# Patient Record
Sex: FEMALE | Race: WHITE | Employment: FULL TIME
[De-identification: names, ages, dates, MRNs, and addresses within clinical notes are randomized per-mention and may not be internally consistent; named-entity substitution may affect disease eponyms.]

---

## 2017-01-10 ENCOUNTER — OFFICE VISIT - HEALTHEAST (OUTPATIENT)
Dept: PODIATRY | Age: 67
End: 2017-01-10

## 2017-01-10 DIAGNOSIS — M21.6X1 PRONATION DEFORMITY OF BOTH FEET: ICD-10-CM

## 2017-01-10 DIAGNOSIS — M21.6X2 PRONATION DEFORMITY OF BOTH FEET: ICD-10-CM

## 2017-05-30 ENCOUNTER — AMBULATORY - RIVER FALLS (OUTPATIENT)
Dept: FAMILY MEDICINE | Facility: CLINIC | Age: 67
End: 2017-05-30
Payer: COMMERCIAL

## 2019-08-16 DIAGNOSIS — Z98.890 STATUS POST GASTRIC SURGERY: Primary | ICD-10-CM

## 2019-08-16 NOTE — PROGRESS NOTES
Lap band in 2004 with Dr. Martinez. Orders placed for upper GI. Will have clinic coordinators reach ou to patient to schedule before Monday's appointment 8/19/19 with Keily Morejon PA-C.

## 2019-08-19 ENCOUNTER — ANCILLARY PROCEDURE (OUTPATIENT)
Dept: GENERAL RADIOLOGY | Facility: CLINIC | Age: 69
End: 2019-08-19
Attending: PHYSICIAN ASSISTANT
Payer: COMMERCIAL

## 2019-08-19 ENCOUNTER — OFFICE VISIT (OUTPATIENT)
Dept: SURGERY | Facility: CLINIC | Age: 69
End: 2019-08-19
Payer: COMMERCIAL

## 2019-08-19 VITALS
OXYGEN SATURATION: 97 % | DIASTOLIC BLOOD PRESSURE: 75 MMHG | SYSTOLIC BLOOD PRESSURE: 111 MMHG | WEIGHT: 205.6 LBS | HEIGHT: 65 IN | HEART RATE: 107 BPM | BODY MASS INDEX: 34.26 KG/M2

## 2019-08-19 DIAGNOSIS — E66.09 CLASS 1 OBESITY DUE TO EXCESS CALORIES WITH SERIOUS COMORBIDITY AND BODY MASS INDEX (BMI) OF 34.0 TO 34.9 IN ADULT: Primary | ICD-10-CM

## 2019-08-19 DIAGNOSIS — Z98.890 STATUS POST GASTRIC SURGERY: ICD-10-CM

## 2019-08-19 DIAGNOSIS — E66.811 CLASS 1 OBESITY DUE TO EXCESS CALORIES WITH SERIOUS COMORBIDITY AND BODY MASS INDEX (BMI) OF 34.0 TO 34.9 IN ADULT: Primary | ICD-10-CM

## 2019-08-19 RX ORDER — OMEPRAZOLE 10 MG/1
20 CAPSULE, DELAYED RELEASE ORAL
COMMUNITY
Start: 2019-07-01

## 2019-08-19 RX ORDER — LISINOPRIL 2.5 MG/1
2.5 TABLET ORAL DAILY
Refills: 2 | COMMUNITY
Start: 2019-07-07

## 2019-08-19 RX ORDER — METFORMIN HCL 500 MG
500 TABLET, EXTENDED RELEASE 24 HR ORAL DAILY
Refills: 0 | COMMUNITY
Start: 2019-05-26

## 2019-08-19 RX ORDER — LIRAGLUTIDE 6 MG/ML
1.8 INJECTION SUBCUTANEOUS DAILY
COMMUNITY

## 2019-08-19 RX ORDER — TOPIRAMATE 25 MG/1
TABLET, FILM COATED ORAL
Qty: 90 TABLET | Refills: 1 | Status: SHIPPED | OUTPATIENT
Start: 2019-08-19

## 2019-08-19 RX ORDER — ROSUVASTATIN CALCIUM 5 MG/1
TABLET, COATED ORAL
Refills: 2 | COMMUNITY
Start: 2019-08-01

## 2019-08-19 ASSESSMENT — MIFFLIN-ST. JEOR: SCORE: 1468.48

## 2019-08-19 NOTE — PROGRESS NOTES
"New Re-establish Bariatric Surgery Consultation Note    2019    RE: Fadumo Manjarrez  MR#: [id number]  : 1950      Referring provider:       2019   Who referred you? daisy       Chief Complaint/Reason for visit: evaluation for possible weight loss surgery    Dear Derek Flores (General),    I had the pleasure of seeing your patient, Fadumo Manjarrez, to evaluate her obesity and re-establish bariatric care. As you know, Fadumo Manjarrez is 69 year old.  She has a height of 5' 5\", a weight of 205 lbs 9.6 oz, and calculated Body mass index is 34.21 kg/m .    Lap band  Dr Martinez 10cc Inamed gastric band  Continued care at [facility]  Starting weight 240 lbs  Lowest weight 205 lbs and stable since  Last adjustment   at [facility] and 1.9 ml in her band  Hx of all fluid removed one time in  for tight band.  UGI today looked good  Can eat steak and raw vegetables  Doesn't tolerate bread as well  Type 2 DM   Last A1C 6.9 6/15/19 and started metformin  Taking victoza 1.8 for many years (Byetta previous to that) and recently started metformin      HISTORY OF PRESENT ILLNESS:  Weight Loss History Reviewed with Patient 2019   How long have you been overweight? Since late 20's to early 40's   What is the most weight you have lost? 30   I have tried the following methods to lose weight Watching portions or calories, Exercise, Weight Watchers, Atkins type diet (low carb/high protein), Pre packaged meals ex: Nutrisystem, OTC Medications, Prescription Medications, Weight Loss Surgery   I have tried the following weight loss medications? (Check all that apply) Redux/dexfenfluramine, Byetta/exenatide, Victoza/liraglutide, Fen-Phen   Have you ever had weight loss surgery? Yes   Please select the type of weight loss surgery you had (select all that apply): adjustable gastric band / LapBand or Realize Band       CO-MORBIDITIES OF OBESITY INCLUDE:     2019   I have the following co-morbidities associated " with obesity: Type II Diabetes, Heart Disease, High Cholesterol, GERD (Reflux)   What was your most recent hemoglobin a1c  7   How many years have you had diabetes? 20   Are you taking daily medication for heartburn, acid reflux, or GERD (acid reflux disease)? Yes       PAST MEDICAL HISTORY:  Past Medical History:   Diagnosis Date     Anxiety      Arthritis      Bone and joint disord back, pelvis, leg complicat preg, childb, puerp      Esophageal reflux      Gallbladder problem      Head injury      Hearing problem      History of diabetes mellitus      Nonsenile cataract      Other heart disorders in diseases classified elsewhere      Stomach problems        PAST SURGICAL HISTORY:  Past Surgical History:   Procedure Laterality Date     ABDOMEN SURGERY       CHOLECYSTECTOMY       EYE SURGERY       ORTHOPEDIC SURGERY         FAMILY HISTORY:   Family History   Problem Relation Age of Onset     Diabetes Father      Hypertension Father      Hyperlipidemia Father      Diabetes Paternal Grandmother      Hypertension Paternal Grandmother      Hyperlipidemia Paternal Grandmother        SOCIAL HISTORY:   Social History Questions Reviewed With Patient 8/19/2019   Which best describes your employment status (select all that apply) I work part-time, I am retired   If you work, 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 Yes   Who do you have in your support network that can be available to help you for the first 2 weeks after surgery?    Who can you count on for support throughout your weight loss surgery journey?  and friends   Can you afford 3 meals a day?  Yes   Can you afford 50-60 dollars a month for vitamins? Yes       HABITS:     8/19/2019   How often do you drink alcohol? Never   Have you ever used any of the following nicotine products? No   Have you or are you currently using street drugs or prescription strength medication for  "which you do not have a prescription for? No   Do you have a history of chemical dependency (alcohol or drug abuse)? No       PSYCHOLOGICAL HISTORY:   Psychological History Reviewed With Patient 8/19/2019   Have you ever attempted suicide? Never.   Have you had thoughts of suicide in the past year? No   Have you ever been hospitalized for mental illness or a suicide attempt? Never.   Do you have a history of chronic pain? No   Have you ever been diagnosed with fibromyalgia? No   Are you currently seeing a therapist or counselor?  No   Are you currently seeing a psychiatrist? No       ROS:     8/19/2019   Skin:  Skin fold rashes (groin or other folds)   HEENT: Dizziness/lightheadedness, Missing teeth   If you answered yes to missing teeth, please indicate how many: 1   Musculoskeletal: Joint Pain, Arthritis   Cardiovascular: Chest pain   Pulmonary: None of the above   Gastrointestinal: Reflux, Diarrhea, Difficulty swallowing (food gets stuck)   Genitourinary: None of the above   Hematological: None of the above   Neurological: None of the above   Female only: None of the above       EATING BEHAVIORS:     8/19/2019   Have you or anyone else thought that you had an eating disorder? Yes   If you answered yes to the previous eating disorder question, select the types that apply from this list: Other   If you answered \"Other\" to the type of eating disorder question above, please describe what it is: low volume but rich sauces and need to eat every 4 to 5 hours except overnight probably diabetes related   Do you currently binge eat (eat a large amount of food in a short time)? No   Are you an emotional eater? Yes   Do you get up to eat after falling asleep? No       EXERCISE:     8/19/2019   How often do you exercise? Daily   What is the duration of your exercise (in minutes)? 20 Minutes   What types of exercise do you do? walking, other   What keeps you from being more active?  I have just had surgery on one or more of my " "joints, I should be more active but I just have not gotten around to it       MEDICATIONS:  Current Outpatient Medications   Medication Sig Dispense Refill     aspirin (ASA) 81 MG tablet Take 81 mg by mouth       liraglutide (VICTOZA) 18 MG/3ML solution Inject 1.8 mg Subcutaneous daily       lisinopril (PRINIVIL/ZESTRIL) 2.5 MG tablet Take 2.5 mg by mouth daily  2     metFORMIN (GLUCOPHAGE-XR) 500 MG 24 hr tablet Take 500 mg by mouth daily  0     rosuvastatin (CRESTOR) 5 MG tablet TAKE 1 TABLET BY MOUTH EVERYDAY AT BEDTIME  2       ALLERGIES:  No Known Allergies    LABS/IMAGING/MEDICAL RECORDS REVIEW:       PHYSICAL EXAM:  /75   Pulse 107   Ht 1.651 m (5' 5\")   Wt 93.3 kg (205 lb 9.6 oz)   SpO2 97%   BMI 34.21 kg/m    General: NAD  Neurologic: A & O x 3, gait normal  Head: normocephalic, atraumatic  HEENT: PERRL, EOMI.   Respiratory: respirations unlabored  Abdomen: Obese, Soft NT ND   Extremities: No LE swelling   Skin: warm and dry.  No rashes on exposed skin  Psychiatric: Mentation and Affect appear normal      In summary, Fadumo Manjarrez has Class I obesity with a body mass index of Body mass index is 34.21 kg/m . kg/m2 and the comorbidities stated above.    Hx of lap band in 2004 with maintained 35 lbs weight loss. Here to establish care.  No band adjustment indicated today 1.9cc in her band according to last clinic visit  Discussed weight loss medications and agreed to start topiramate ramp to 50 mg  See Lauren Bloch [facility] pharmacist by phone 1 month after starting topiramate      Sincerely,     Keily Morejon PA-C    I spent a total of 60 minutes face to face with the patient during today's office visit. Over 50% of this time was spent counseling the patient and/or coordinating care.  "

## 2019-08-19 NOTE — NURSING NOTE
"(   Chief Complaint   Patient presents with     New Patient     [facility]    )    ( Weight: 93.3 kg (205 lb 9.6 oz) )  ( Height: 165.1 cm (5' 5\") )  ( BMI (Calculated): 34.21 )  (   )  (   )  (   )  (   )  ( Waist Circumference (cm): 110 cm )  (   )    ( BP: 111/75 )  (   )  (   )  (   )  ( Pulse: 107 )  (   )  ( SpO2: 97 % )    ( There is no problem list on file for this patient.   )  (   Current Outpatient Medications   Medication Sig Dispense Refill     aspirin (ASA) 81 MG tablet Take 81 mg by mouth       liraglutide (VICTOZA) 18 MG/3ML solution Inject 1.8 mg Subcutaneous daily       lisinopril (PRINIVIL/ZESTRIL) 2.5 MG tablet Take 2.5 mg by mouth daily  2     metFORMIN (GLUCOPHAGE-XR) 500 MG 24 hr tablet Take 500 mg by mouth daily  0     rosuvastatin (CRESTOR) 5 MG tablet TAKE 1 TABLET BY MOUTH EVERYDAY AT BEDTIME  2    )  ( Diabetes Eval:    )    ( Pain Eval:  Data Unavailable )    ( Wound Eval:       )    (   History   Smoking Status     Never Smoker   Smokeless Tobacco     Never Used    )    ( Signed By:  Mckayla Cotto; August 19, 2019; 8:59 AM )    "

## 2019-08-19 NOTE — PATIENT INSTRUCTIONS
See Lauren Bloch [facility] pharmacist by phone 1 month after starting topiramate    Start topiramate ramp to 50 mg    See Keily Morejon in 3 months return [facility]    MEDICATION STARTED AT THIS APPOINTMENT  We are starting topiramate at bedtime.  Start one tab, 25 mg, for a week. Go up to 50 mg (2 tabs) for the next week.  Stay at 2 tabs until you are seen again. Call the nurse at [phone] if you have any questions or concerns. (Do not stop taking it if you don't think it's working. For some people it works even though they do not feel much different.)    Topiramate (Topamax) is a medication that is used most often to treat migraine headaches or for seizures. It has also been found to help with weight loss. Although it's not currently FDA approved for weight loss, it has been used safely for a number of years to help people who are carrying extra weight.     Just how topiramate helps with weight loss has not been exactly determined. However it seems to work on areas of the brain to quiet down signals related to eating.      Topiramate may make you:    >feel less interest in eating in between meals   >think less about food and eating   >find it easier to push the plate away   >find giving up pop easier    >have an easier time eating less    For some of our patients, the pills work right away. They feel and think quite differently about food. Other patients don't feel much of a change but find in fact they have lost weight! Like all weight loss medications, topiramate works best when you help it work.  This means:    1) Have less tempting high calorie (fattening) food around the house or office    2) Have lower calorie food (fruits, vegetables,low fat meats and dairy) for snacks    3) Eat out only one time or less each week.   4) Eat your meals at a table with the TV or computer off.    Side-effects. Topiramate is generally well tolerated. The main side-effects we see are:   Tingling in hands,feet, or face (usually not very  troublesome)   Mental confusion and word finding trouble (about 10% of patients have this.)     Feeling sleepy or a bit dopey- this goes away very soon after starting.    One of the dangers of topiramate is the possibility of birth defects--if you get pregnant when you are on it, there is the risk that your baby will be born with a cleft lip or palate.  If you are on topiramate and of child bearing age, you need to be on a reliable form of birth control or refrain from sexual intercourse.     Please refer to the pharmacy insert for more information on side-effects. Since many pharmacists are not familiar with the use of topiramate in weight loss, calling the clinic will get you the most accurate information on the use of this medication for weight loss.     In order to get refills of this or any medication we prescribe you must be seen in the medical weight mgmt clinic every 2-3 months. Please have your pharmacy fax a refill request to [phone].

## 2019-08-19 NOTE — LETTER
"2019       RE: Fadumo Manjarrez  [address] [place] [id number]     Dear Colleague,    Thank you for referring your patient, Fadumo Manjarrez, to the [facility] SURGICAL WEIGHT MANAGEMENT at [facility]. Please see a copy of my visit note below.    New Re-establish Bariatric Surgery Consultation Note    2019    RE: Fadumo Manjarrez  MR#: [id number]  : 1950      Referring provider:       2019   Who referred you? daisy       Chief Complaint/Reason for visit: evaluation for possible weight loss surgery    Dear Derek Flores (General),    I had the pleasure of seeing your patient, Fadumo Manjarrez, to evaluate her obesity and re-establish bariatric care. As you know, Fadumo Manjarrez is 69 year old.  She has a height of 5' 5\", a weight of 205 lbs 9.6 oz, and calculated Body mass index is 34.21 kg/m .    Lap band  Dr Martinez 10cc Inamed gastric band  Contin[place] at [facility]  Starting weight 240 lbs  Lowest weight 205 lbs and stable since  Last adjustment   at [facility] and 1.9 ml in her band  Hx of all fluid removed one time in  for tight band.  UGI today looked good  Can eat steak and raw vegetables  Doesn't tolerate bread as well  Type 2 DM   Last A1C 6.9 6/15/19 and started metformin  Taking victoza 1.8 for many years (Byetta previous to that) and recently started metformin      HISTORY OF PRESENT ILLNESS:  Weight Loss History Reviewed with Patient 2019   How long have you been overweight? Since late 20's to early 40's   What is the most weight you have lost? 30   I have tried the following methods to lose weight Watching portions or calories, Exercise, Weight Watchers, Atkins type diet (low carb/high protein), Pre packaged meals ex: Nutrisystem, OTC Medications, Prescription Medications, Weight Loss Surgery   I have tried the following weight loss medications? (Check all that apply) Redux/dexfenfluramine, Byetta/exenatide, " Victoza/liraglutide, Fen-Phen   Have you ever had weight loss surgery? Yes   Please select the type of weight loss surgery you had (select all that apply): adjustable gastric band / LapBand or Realize Band       CO-MORBIDITIES OF OBESITY INCLUDE:     8/19/2019   I have the following co-morbidities associated with obesity: Type II Diabetes, Heart Disease, High Cholesterol, GERD (Reflux)   What was your most recent hemoglobin a1c  7   How many years have you had diabetes? 20   Are you taking daily medication for heartburn, acid reflux, or GERD (acid reflux disease)? Yes       PAST MEDICAL HISTORY:  Past Medical History:   Diagnosis Date     Anxiety      Arthritis      Bone and joint disord back, pelvis, leg complicat preg, childb, puerp      Esophageal reflux      Gallbladder problem      Head injury      Hearing problem      History of diabetes mellitus      Nonsenile cataract      Other heart disorders in diseases classified elsewhere      Stomach problems        PAST SURGICAL HISTORY:  Past Surgical History:   Procedure Laterality Date     ABDOMEN SURGERY       CHOLECYSTECTOMY       EYE SURGERY       ORTHOPEDIC SURGERY         FAMILY HISTORY:   Family History   Problem Relation Age of Onset     Diabetes Father      Hypertension Father      Hyperlipidemia Father      Diabetes Paternal Grandmother      Hypertension Paternal Grandmother      Hyperlipidemia Paternal Grandmother        SOCIAL HISTORY:   Social History Questions Reviewed With Patient 8/19/2019   Which best describes your employment status (select all that apply) I work part-time, I am retired   If you work, 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 Yes   Who do you have in your support network that can be available to help you for the first 2 weeks after surgery?    Who can you count on for support throughout your weight loss surgery journey?  and friends   Can  "you afford 3 meals a day?  Yes   Can you afford 50-60 dollars a month for vitamins? Yes       HABITS:     8/19/2019   How often do you drink alcohol? Never   Have you ever used any of the following nicotine products? No   Have you or are you currently using street drugs or prescription strength medication for which you do not have a prescription for? No   Do you have a history of chemical dependency (alcohol or drug abuse)? No       PSYCHOLOGICAL HISTORY:   Psychological History Reviewed With Patient 8/19/2019   Have you ever attempted suicide? Never.   Have you had thoughts of suicide in the past year? No   Have you ever been hospitalized for mental illness or a suicide attempt? Never.   Do you have a history of chronic pain? No   Have you ever been diagnosed with fibromyalgia? No   Are you currently seeing a therapist or counselor?  No   Are you currently seeing a psychiatrist? No       ROS:     8/19/2019   Skin:  Skin fold rashes (groin or other folds)   HEENT: Dizziness/lightheadedness, Missing teeth   If you answered yes to missing teeth, please indicate how many: 1   Musculoskeletal: Joint Pain, Arthritis   Cardiovascular: Chest pain   Pulmonary: None of the above   Gastrointestinal: Reflux, Diarrhea, Difficulty swallowing (food gets stuck)   Genitourinary: None of the above   Hematological: None of the above   Neurological: None of the above   Female only: None of the above       EATING BEHAVIORS:     8/19/2019   Have you or anyone else thought that you had an eating disorder? Yes   If you answered yes to the previous eating disorder question, select the types that apply from this list: Other   If you answered \"Other\" to the type of eating disorder question above, please describe what it is: low volume but rich sauces and need to eat every 4 to 5 hours except overnight probably diabetes related   Do you currently binge eat (eat a large amount of food in a short time)? No   Are you an emotional eater? Yes   Do " "you get up to eat after falling asleep? No       EXERCISE:     8/19/2019   How often do you exercise? Daily   What is the duration of your exercise (in minutes)? 20 Minutes   What types of exercise do you do? walking, other   What keeps you from being more active?  I have just had surgery on one or more of my joints, I should be more active but I just have not gotten around to it       MEDICATIONS:  Current Outpatient Medications   Medication Sig Dispense Refill     aspirin (ASA) 81 MG tablet Take 81 mg by mouth       liraglutide (VICTOZA) 18 MG/3ML solution Inject 1.8 mg Subcutaneous daily       lisinopril (PRINIVIL/ZESTRIL) 2.5 MG tablet Take 2.5 mg by mouth daily  2     metFORMIN (GLUCOPHAGE-XR) 500 MG 24 hr tablet Take 500 mg by mouth daily  0     rosuvastatin (CRESTOR) 5 MG tablet TAKE 1 TABLET BY MOUTH EVERYDAY AT BEDTIME  2       ALLERGIES:  No Known Allergies    LABS/IMAGING/MEDICAL RECORDS REVIEW:       PHYSICAL EXAM:  /75   Pulse 107   Ht 1.651 m (5' 5\")   Wt 93.3 kg (205 lb 9.6 oz)   SpO2 97%   BMI 34.21 kg/m     General: NAD  Neurologic: A & O x 3, gait normal  Head: normocephalic, atraumatic  HEENT: PERRL, EOMI.   Respiratory: respirations unlabored  Abdomen: Obese, Soft NT ND   Extremities: No LE swelling   Skin: warm and dry.  No rashes on exposed skin  Psychiatric: Mentation and Affect appear normal      In summary, Fadumo Manjarrez has Class I obesity with a body mass index of Body mass index is 34.21 kg/m . kg/m2 and the comorbidities stated above.    Hx of lap band in 2004 with maintained 35 lbs weight loss. Here to establish care.  No band adjustment indicated today 1.9cc in her band according to last clinic visit  Discussed weight loss medications and agreed to start topiramate ramp to 50 mg  See Lauren Bloch [facility] pharmacist by phone 1 month after starting topiramate      Sincerely,     Keily Morejon PA-C    I spent a total of 60 minutes face to face with the patient during " today's office visit. Over 50% of this time was spent counseling the patient and/or coordinating care.

## 2019-08-27 ENCOUNTER — NURSE TRIAGE (OUTPATIENT)
Dept: NURSING | Facility: CLINIC | Age: 69
End: 2019-08-27

## 2019-08-27 NOTE — TELEPHONE ENCOUNTER
"Prior Authorization Retail Medication Request    Medication/Dose: TOPIRAMATE  ICD code (if different than what is on RX):  Class 1 obesity due to excess calories with serious comorbidity and body mass index (BMI) of 34.0 to 34.9 in adult [E66.09, Z68.34]  - Primary   Previously Tried and Failed:  Weight loss surgery, history of diet and exercise, Watching portions or calories, Exercise, Weight Watchers, Atkins type diet (low carb/high protein), Pre packaged meals ex: Nutrisystem, OTC Medications, Prescription Medications  Rationale:  Fadumo Manjarrez is 69 year old.  She has a height of 5' 5\", a weight of 205 lbs 9.6 oz, and calculated Body mass index is 34.21 kg/m .     Lap band 2004 Dr Martinez 10cc Inamed gastric band  Continued care at [facility]  Starting weight 240 lbs  Lowest weight 205 lbs and stable since  Last adjustment  2016 at [facility] and 1.9 ml in her band  Hx of all fluid removed one time in 2015 for tight band.  UGI today looked good  Can eat steak and raw vegetables  Doesn't tolerate bread as well  Type 2 DM   Last A1C 6.9 6/15/19 and started metformin  Taking victoza 1.8 for many years (Byetta previous to that) and recently started metformin    Insurance Name:    Insurance ID:        Pharmacy Information (if different than what is on RX)  Name:  [id number] - [place], [place] - [address]  Phone:  [phone]  "

## 2019-08-27 NOTE — TELEPHONE ENCOUNTER
Health Call Center/ Red Flag triage    Phone Message: Patient precribed Topiramate on 8/19/2019 by Keily Morejon,       May a detailed message be left on voicemail: yes    Reason for Call: Medication Question or concern regarding medication   Prescription Clarification  Name of Medication: Topiamate  Prescribing Provider: Darleen   Pharmacy: [id number] [phone]   What on the order needs clarification? Prior authorization vs additional information     Topiramate: Darleen prescribed 8/19/2019 but not yet filled: [facility] [address] told her insurance would not pay unless they received additional information, but she hasn't heard back, checked at [facility] today.  ( patient isn't sure what is needed)  from provider.She'd like to start medication on 9/2/2019, so is calling to see if [facility] has contacted insurance company as yet.        Action Taken: Message routed to:  [facility] ([facility] Weight Management

## 2019-09-04 ENCOUNTER — TELEPHONE (OUTPATIENT)
Dept: SURGERY | Facility: CLINIC | Age: 69
End: 2019-09-04

## 2019-09-04 NOTE — TELEPHONE ENCOUNTER
Central Prior Authorization Team   Phone: [phone]    PA Initiation    Medication: topiramate (TOPAMAX) 25 MG tablet  Insurance Company: [employer] - Phone [phone] Fax [phone]  Pharmacy Filling the Rx: [id number] - [place], [place] - [address] [place] [address]  Filling Pharmacy Phone: [phone]  Filling Pharmacy Fax:    Start Date: 9/4/2019

## 2019-09-04 NOTE — TELEPHONE ENCOUNTER
PRIOR AUTHORIZATION DENIED    Medication: topiramate (TOPAMAX) 25 MG tablet - P/A DENIED    Denial Date: 9/4/2019    Denial Rational:         Appeal Information:

## 2019-09-04 NOTE — TELEPHONE ENCOUNTER
"Started a new encounter for the P/A as the other one was started under a \"Nurse Triage\" encounter and I did not have access to it.          "

## 2019-09-24 ENCOUNTER — TELEPHONE (OUTPATIENT)
Dept: ENDOCRINOLOGY | Facility: CLINIC | Age: 69
End: 2019-09-24

## 2019-09-24 NOTE — TELEPHONE ENCOUNTER
Called and left message for patient in regards to Topiramate rx. Patient left message with Pablo, surgery scheduler, about medication being denied. Left message to inform patient that PA was denied, medicare part D does not cover under chosen diagnosis. Advised patient to contact pharmacy to check out of pocket cost for Topiramate.

## 2021-06-08 NOTE — PROGRESS NOTES
Subjective findings: The patient will return to the clinic today for orthotic adjustment.  She is being treated for pronation deformity.  She is to return to the clinic as needed.

## 2022-03-16 ENCOUNTER — HOSPITAL ENCOUNTER (EMERGENCY)
Facility: CLINIC | Age: 72
Discharge: LEFT BEFORE DISCHARGE | End: 2022-03-16
Admitting: EMERGENCY MEDICINE
Payer: COMMERCIAL

## 2022-03-16 VITALS
DIASTOLIC BLOOD PRESSURE: 82 MMHG | WEIGHT: 190 LBS | HEIGHT: 65 IN | RESPIRATION RATE: 16 BRPM | SYSTOLIC BLOOD PRESSURE: 131 MMHG | TEMPERATURE: 97.6 F | HEART RATE: 94 BPM | OXYGEN SATURATION: 98 % | BODY MASS INDEX: 31.65 KG/M2

## 2022-03-16 LAB
ANION GAP SERPL CALCULATED.3IONS-SCNC: 7 MMOL/L (ref 3–14)
ATRIAL RATE - MUSE: 97 BPM
BUN SERPL-MCNC: 24 MG/DL (ref 7–30)
CALCIUM SERPL-MCNC: 9.6 MG/DL (ref 8.5–10.1)
CHLORIDE BLD-SCNC: 104 MMOL/L (ref 94–109)
CO2 SERPL-SCNC: 24 MMOL/L (ref 20–32)
CREAT SERPL-MCNC: 0.8 MG/DL (ref 0.52–1.04)
DIASTOLIC BLOOD PRESSURE - MUSE: NORMAL MMHG
ERYTHROCYTE [DISTWIDTH] IN BLOOD BY AUTOMATED COUNT: 13.9 % (ref 10–15)
GFR SERPL CREATININE-BSD FRML MDRD: 79 ML/MIN/1.73M2
GLUCOSE BLD-MCNC: 173 MG/DL (ref 70–99)
HCT VFR BLD AUTO: 43.6 % (ref 35–47)
HGB BLD-MCNC: 14.2 G/DL (ref 11.7–15.7)
INTERPRETATION ECG - MUSE: NORMAL
MCH RBC QN AUTO: 29.7 PG (ref 26.5–33)
MCHC RBC AUTO-ENTMCNC: 32.6 G/DL (ref 31.5–36.5)
MCV RBC AUTO: 91 FL (ref 78–100)
P AXIS - MUSE: 43 DEGREES
PLATELET # BLD AUTO: 246 10E3/UL (ref 150–450)
POTASSIUM BLD-SCNC: 4.1 MMOL/L (ref 3.4–5.3)
PR INTERVAL - MUSE: 192 MS
QRS DURATION - MUSE: 118 MS
QT - MUSE: 372 MS
QTC - MUSE: 472 MS
R AXIS - MUSE: -52 DEGREES
RBC # BLD AUTO: 4.78 10E6/UL (ref 3.8–5.2)
SODIUM SERPL-SCNC: 135 MMOL/L (ref 133–144)
SYSTOLIC BLOOD PRESSURE - MUSE: NORMAL MMHG
T AXIS - MUSE: 34 DEGREES
TROPONIN I SERPL HS-MCNC: 7 NG/L
VENTRICULAR RATE- MUSE: 97 BPM
WBC # BLD AUTO: 10.3 10E3/UL (ref 4–11)

## 2022-03-16 PROCEDURE — 36415 COLL VENOUS BLD VENIPUNCTURE: CPT | Performed by: EMERGENCY MEDICINE

## 2022-03-16 PROCEDURE — 85027 COMPLETE CBC AUTOMATED: CPT | Performed by: EMERGENCY MEDICINE

## 2022-03-16 PROCEDURE — 84484 ASSAY OF TROPONIN QUANT: CPT | Performed by: EMERGENCY MEDICINE

## 2022-03-16 PROCEDURE — 999N000104 HC STATISTIC NO CHARGE

## 2022-03-16 PROCEDURE — 93005 ELECTROCARDIOGRAM TRACING: CPT

## 2022-03-16 PROCEDURE — 80048 BASIC METABOLIC PNL TOTAL CA: CPT | Performed by: EMERGENCY MEDICINE

## 2022-03-16 NOTE — ED TRIAGE NOTES
Feeling light headed and had near syncope today at work and 2 more episodes last 2 days. Was told she has pumping issues, recently had angiogram done was negative.

## 2022-04-17 ENCOUNTER — HEALTH MAINTENANCE LETTER (OUTPATIENT)
Age: 72
End: 2022-04-17

## 2022-10-23 ENCOUNTER — HEALTH MAINTENANCE LETTER (OUTPATIENT)
Age: 72
End: 2022-10-23

## 2023-11-05 ENCOUNTER — HEALTH MAINTENANCE LETTER (OUTPATIENT)
Age: 73
End: 2023-11-05

## 2024-06-02 ENCOUNTER — HEALTH MAINTENANCE LETTER (OUTPATIENT)
Age: 74
End: 2024-06-02

## 2024-12-22 ENCOUNTER — HEALTH MAINTENANCE LETTER (OUTPATIENT)
Age: 74
End: 2024-12-22